# Patient Record
Sex: FEMALE | Race: WHITE | NOT HISPANIC OR LATINO | URBAN - METROPOLITAN AREA
[De-identification: names, ages, dates, MRNs, and addresses within clinical notes are randomized per-mention and may not be internally consistent; named-entity substitution may affect disease eponyms.]

---

## 2022-03-31 ENCOUNTER — EMERGENCY (EMERGENCY)
Facility: HOSPITAL | Age: 52
LOS: 1 days | Discharge: HOME | End: 2022-03-31
Attending: EMERGENCY MEDICINE | Admitting: EMERGENCY MEDICINE
Payer: COMMERCIAL

## 2022-03-31 VITALS
DIASTOLIC BLOOD PRESSURE: 84 MMHG | SYSTOLIC BLOOD PRESSURE: 133 MMHG | OXYGEN SATURATION: 99 % | TEMPERATURE: 98 F | RESPIRATION RATE: 18 BRPM | HEART RATE: 63 BPM

## 2022-03-31 DIAGNOSIS — Z98.890 OTHER SPECIFIED POSTPROCEDURAL STATES: Chronic | ICD-10-CM

## 2022-03-31 PROCEDURE — 99282 EMERGENCY DEPT VISIT SF MDM: CPT

## 2022-03-31 PROCEDURE — 99284 EMERGENCY DEPT VISIT MOD MDM: CPT
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
## 2022-03-31 NOTE — ED PROVIDER NOTE - PATIENT PORTAL LINK FT
You can access the FollowMyHealth Patient Portal offered by [facility] by registering at the following website: [email or web address]. By joining [facility]’s FollowMyHealth portal, you will also be able to view your health information using other applications (apps) compatible with our system.

## 2022-03-31 NOTE — ED PROVIDER NOTE - CHPI ED SYMPTOMS NEG
no blurred vision/no chest tightness/no diarrhea/no discoloration/no disorientation/no fever/no hypotension/no shortness of breath/no vomiting/no change in level of consciousness/no difficulty breathing/no lethargy
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
## 2022-03-31 NOTE — ED PROVIDER NOTE - PROGRESS NOTE DETAILS
Patient placed on oxygen LFNC for symptomatic improvement. Repeat co-oximetry CO2 level after 15minutes on oxygen unchanged at 1. Patient placed on oxygen LFNC for symptomatic improvement. Repeat co-oximetry CO2 level after 15minutes on oxygen unchanged at 1. Patient appears well and ready for discharge home. Carboxyhgb level: 1. Patient placed on oxygen LFNC for symptomatic improvement. Repeat co-oximetry CO2 level after 15minutes on oxygen unchanged at 1. Patient appears well and ready for discharge home.

## 2024-08-12 NOTE — ED PROVIDER NOTE - CPE EDP RESP NORM
Patient's first and last name, , procedure, and correct site confirmed prior to the start of procedure. normal...